# Patient Record
Sex: MALE | Race: OTHER | HISPANIC OR LATINO | URBAN - METROPOLITAN AREA
[De-identification: names, ages, dates, MRNs, and addresses within clinical notes are randomized per-mention and may not be internally consistent; named-entity substitution may affect disease eponyms.]

---

## 2023-08-17 ENCOUNTER — EMERGENCY (EMERGENCY)
Facility: HOSPITAL | Age: 53
LOS: 0 days | Discharge: HOME | End: 2023-08-17
Attending: STUDENT IN AN ORGANIZED HEALTH CARE EDUCATION/TRAINING PROGRAM
Payer: SELF-PAY

## 2023-08-17 VITALS
RESPIRATION RATE: 18 BRPM | WEIGHT: 175.05 LBS | SYSTOLIC BLOOD PRESSURE: 158 MMHG | HEART RATE: 68 BPM | TEMPERATURE: 98 F | DIASTOLIC BLOOD PRESSURE: 89 MMHG | OXYGEN SATURATION: 99 %

## 2023-08-17 DIAGNOSIS — M25.511 PAIN IN RIGHT SHOULDER: ICD-10-CM

## 2023-08-17 DIAGNOSIS — M54.2 CERVICALGIA: ICD-10-CM
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
## 2023-08-17 DIAGNOSIS — R07.89 OTHER CHEST PAIN: ICD-10-CM
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
## 2023-08-17 LAB
ALBUMIN SERPL ELPH-MCNC: 4.7 G/DL — SIGNIFICANT CHANGE UP (ref 3.5–5.2)
ALP SERPL-CCNC: 81 U/L — SIGNIFICANT CHANGE UP (ref 30–115)
ALT FLD-CCNC: 18 U/L — SIGNIFICANT CHANGE UP (ref 0–41)
ANION GAP SERPL CALC-SCNC: 10 MMOL/L — SIGNIFICANT CHANGE UP (ref 7–14)
APPEARANCE UR: CLEAR — SIGNIFICANT CHANGE UP
APTT BLD: 33.4 SEC — SIGNIFICANT CHANGE UP (ref 27–39.2)
AST SERPL-CCNC: 24 U/L — SIGNIFICANT CHANGE UP (ref 0–41)
BASOPHILS # BLD AUTO: 0.08 K/UL — SIGNIFICANT CHANGE UP (ref 0–0.2)
BASOPHILS NFR BLD AUTO: 0.7 % — SIGNIFICANT CHANGE UP (ref 0–1)
BILIRUB SERPL-MCNC: 1 MG/DL — SIGNIFICANT CHANGE UP (ref 0.2–1.2)
BILIRUB UR-MCNC: NEGATIVE — SIGNIFICANT CHANGE UP
BLD GP AB SCN SERPL QL: SIGNIFICANT CHANGE UP
BUN SERPL-MCNC: 14 MG/DL — SIGNIFICANT CHANGE UP (ref 10–20)
CALCIUM SERPL-MCNC: 9 MG/DL — SIGNIFICANT CHANGE UP (ref 8.4–10.5)
CHLORIDE SERPL-SCNC: 103 MMOL/L — SIGNIFICANT CHANGE UP (ref 98–110)
CO2 SERPL-SCNC: 23 MMOL/L — SIGNIFICANT CHANGE UP (ref 17–32)
COLOR SPEC: YELLOW — SIGNIFICANT CHANGE UP
CREAT SERPL-MCNC: 0.7 MG/DL — SIGNIFICANT CHANGE UP (ref 0.7–1.5)
DIFF PNL FLD: NEGATIVE — SIGNIFICANT CHANGE UP
EGFR: 111 ML/MIN/1.73M2 — SIGNIFICANT CHANGE UP
EOSINOPHIL # BLD AUTO: 0.14 K/UL — SIGNIFICANT CHANGE UP (ref 0–0.7)
EOSINOPHIL NFR BLD AUTO: 1.3 % — SIGNIFICANT CHANGE UP (ref 0–8)
ETHANOL SERPL-MCNC: <10 MG/DL — SIGNIFICANT CHANGE UP
GLUCOSE SERPL-MCNC: 89 MG/DL — SIGNIFICANT CHANGE UP (ref 70–99)
GLUCOSE UR QL: NEGATIVE MG/DL — SIGNIFICANT CHANGE UP
HCT VFR BLD CALC: 41.6 % — LOW (ref 42–52)
HGB BLD-MCNC: 13.7 G/DL — LOW (ref 14–18)
IMM GRANULOCYTES NFR BLD AUTO: 0.5 % — HIGH (ref 0.1–0.3)
INR BLD: 1.04 RATIO — SIGNIFICANT CHANGE UP (ref 0.65–1.3)
KETONES UR-MCNC: ABNORMAL MG/DL
LACTATE SERPL-SCNC: 0.7 MMOL/L — SIGNIFICANT CHANGE UP (ref 0.7–2)
LEUKOCYTE ESTERASE UR-ACNC: NEGATIVE — SIGNIFICANT CHANGE UP
LIDOCAIN IGE QN: 22 U/L — SIGNIFICANT CHANGE UP (ref 7–60)
LYMPHOCYTES # BLD AUTO: 2.2 K/UL — SIGNIFICANT CHANGE UP (ref 1.2–3.4)
LYMPHOCYTES # BLD AUTO: 20.1 % — LOW (ref 20.5–51.1)
MCHC RBC-ENTMCNC: 28.1 PG — SIGNIFICANT CHANGE UP (ref 27–31)
MCHC RBC-ENTMCNC: 32.9 G/DL — SIGNIFICANT CHANGE UP (ref 32–37)
MCV RBC AUTO: 85.2 FL — SIGNIFICANT CHANGE UP (ref 80–94)
MONOCYTES # BLD AUTO: 0.59 K/UL — SIGNIFICANT CHANGE UP (ref 0.1–0.6)
MONOCYTES NFR BLD AUTO: 5.4 % — SIGNIFICANT CHANGE UP (ref 1.7–9.3)
NEUTROPHILS # BLD AUTO: 7.91 K/UL — HIGH (ref 1.4–6.5)
NEUTROPHILS NFR BLD AUTO: 72 % — SIGNIFICANT CHANGE UP (ref 42.2–75.2)
NITRITE UR-MCNC: NEGATIVE — SIGNIFICANT CHANGE UP
NRBC # BLD: 0 /100 WBCS — SIGNIFICANT CHANGE UP (ref 0–0)
PH UR: 7 — SIGNIFICANT CHANGE UP (ref 5–8)
PLATELET # BLD AUTO: 218 K/UL — SIGNIFICANT CHANGE UP (ref 130–400)
PMV BLD: 12.2 FL — HIGH (ref 7.4–10.4)
POTASSIUM SERPL-MCNC: 3.4 MMOL/L — LOW (ref 3.5–5)
POTASSIUM SERPL-SCNC: 3.4 MMOL/L — LOW (ref 3.5–5)
PROT SERPL-MCNC: 6.6 G/DL — SIGNIFICANT CHANGE UP (ref 6–8)
PROT UR-MCNC: NEGATIVE MG/DL — SIGNIFICANT CHANGE UP
PROTHROM AB SERPL-ACNC: 11.9 SEC — SIGNIFICANT CHANGE UP (ref 9.95–12.87)
RBC # BLD: 4.88 M/UL — SIGNIFICANT CHANGE UP (ref 4.7–6.1)
RBC # FLD: 13.8 % — SIGNIFICANT CHANGE UP (ref 11.5–14.5)
SODIUM SERPL-SCNC: 136 MMOL/L — SIGNIFICANT CHANGE UP (ref 135–146)
SP GR SPEC: >1.03 — HIGH (ref 1–1.03)
UROBILINOGEN FLD QL: 1 MG/DL — SIGNIFICANT CHANGE UP (ref 0.2–1)
WBC # BLD: 10.97 K/UL — HIGH (ref 4.8–10.8)
WBC # FLD AUTO: 10.97 K/UL — HIGH (ref 4.8–10.8)

## 2023-08-17 PROCEDURE — 86900 BLOOD TYPING SEROLOGIC ABO: CPT

## 2023-08-17 PROCEDURE — 85025 COMPLETE CBC W/AUTO DIFF WBC: CPT

## 2023-08-17 PROCEDURE — 76705 ECHO EXAM OF ABDOMEN: CPT | Mod: 26

## 2023-08-17 PROCEDURE — 72125 CT NECK SPINE W/O DYE: CPT | Mod: 26,MA

## 2023-08-17 PROCEDURE — 85610 PROTHROMBIN TIME: CPT

## 2023-08-17 PROCEDURE — 80053 COMPREHEN METABOLIC PANEL: CPT

## 2023-08-17 PROCEDURE — 71260 CT THORAX DX C+: CPT | Mod: MA

## 2023-08-17 PROCEDURE — 99285 EMERGENCY DEPT VISIT HI MDM: CPT

## 2023-08-17 PROCEDURE — 99284 EMERGENCY DEPT VISIT MOD MDM: CPT | Mod: 25

## 2023-08-17 PROCEDURE — 72170 X-RAY EXAM OF PELVIS: CPT | Mod: 26

## 2023-08-17 PROCEDURE — 71045 X-RAY EXAM CHEST 1 VIEW: CPT | Mod: 26

## 2023-08-17 PROCEDURE — 96374 THER/PROPH/DIAG INJ IV PUSH: CPT | Mod: XU

## 2023-08-17 PROCEDURE — 81003 URINALYSIS AUTO W/O SCOPE: CPT

## 2023-08-17 PROCEDURE — 71045 X-RAY EXAM CHEST 1 VIEW: CPT

## 2023-08-17 PROCEDURE — 74177 CT ABD & PELVIS W/CONTRAST: CPT | Mod: 26,MA

## 2023-08-17 PROCEDURE — 72170 X-RAY EXAM OF PELVIS: CPT

## 2023-08-17 PROCEDURE — 83605 ASSAY OF LACTIC ACID: CPT

## 2023-08-17 PROCEDURE — 73030 X-RAY EXAM OF SHOULDER: CPT | Mod: RT

## 2023-08-17 PROCEDURE — 71260 CT THORAX DX C+: CPT | Mod: 26,MA

## 2023-08-17 PROCEDURE — 74177 CT ABD & PELVIS W/CONTRAST: CPT | Mod: MA

## 2023-08-17 PROCEDURE — 86850 RBC ANTIBODY SCREEN: CPT

## 2023-08-17 PROCEDURE — 70450 CT HEAD/BRAIN W/O DYE: CPT | Mod: MA

## 2023-08-17 PROCEDURE — 80307 DRUG TEST PRSMV CHEM ANLYZR: CPT

## 2023-08-17 PROCEDURE — 83690 ASSAY OF LIPASE: CPT

## 2023-08-17 PROCEDURE — 70450 CT HEAD/BRAIN W/O DYE: CPT | Mod: 26,MA

## 2023-08-17 PROCEDURE — 76705 ECHO EXAM OF ABDOMEN: CPT

## 2023-08-17 PROCEDURE — 72125 CT NECK SPINE W/O DYE: CPT | Mod: MA

## 2023-08-17 PROCEDURE — 85730 THROMBOPLASTIN TIME PARTIAL: CPT

## 2023-08-17 PROCEDURE — 86901 BLOOD TYPING SEROLOGIC RH(D): CPT

## 2023-08-17 PROCEDURE — 36415 COLL VENOUS BLD VENIPUNCTURE: CPT

## 2023-08-17 PROCEDURE — 73030 X-RAY EXAM OF SHOULDER: CPT | Mod: 26,RT

## 2023-08-17 RX ORDER — SODIUM CHLORIDE 9 MG/ML
250 INJECTION INTRAMUSCULAR; INTRAVENOUS; SUBCUTANEOUS ONCE
Refills: 0 | Status: COMPLETED | OUTPATIENT
Start: 2023-08-17 | End: 2023-08-17

## 2023-08-17 RX ORDER — MORPHINE SULFATE 50 MG/1
4 CAPSULE, EXTENDED RELEASE ORAL ONCE
Refills: 0 | Status: DISCONTINUED | OUTPATIENT
Start: 2023-08-17 | End: 2023-08-17

## 2023-08-17 RX ADMIN — MORPHINE SULFATE 4 MILLIGRAM(S): 50 CAPSULE, EXTENDED RELEASE ORAL at 14:30

## 2023-08-17 RX ADMIN — SODIUM CHLORIDE 250 MILLILITER(S): 9 INJECTION INTRAMUSCULAR; INTRAVENOUS; SUBCUTANEOUS at 14:29

## 2023-08-17 NOTE — ED PROVIDER NOTE - ATTENDING APP SHARED VISIT CONTRIBUTION OF CARE
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  NECK: Supple; no meningeal signs  CARD: RRR; nl S1/S2; no M/R/G. Pulses equal bilaterally.  RESP: Respiratory rate and effort are normal; breath sounds clear and equal bilaterally.  ABD: Soft, ND + rlq ttp  MSK/EXT: No gross deformities + ttp right shoulder + midline c5/c6 ttp. + mildine thoracic ttp no lumbar ttp  SKIN: Warm and dry;   NEURO: AAOx3, Motor 5/5 x 4 extremities  PSYCH: Memory Intact, Normal Affect

## 2023-08-17 NOTE — ED PROVIDER NOTE - PHYSICAL EXAMINATION
CONST: Well appearing in NAD  Head: NCAT. No tamez signs or periorbital ecchymosis   EYES: PERRL, EOMI, Sclera and conjunctiva clear.   ENT: Oropharynx normal appearing, no erythema or exudates. Uvula midline.  NECK: midline cervical tenderness. C collar in place.   CARD: Normal S1 S2; Normal rate and rhythm  RESP: Equal BS B/L, No wheezes, rhonchi or rales. No distress  GI: RUQ and LUQ tenderenss. + Guarding. Abd soft, non distended. No Bath or rashid turners sign.   MS: Pain w/ AROM of RUE at shoulder. Midline thoracic tenderness. No palpable stepoffs.   SKIN: Warm, dry, no acute rashes. Good turgor  NEURO: A&Ox3, No focal deficits. Strength 5/5 with no sensory deficits.

## 2023-08-17 NOTE — ED PROVIDER NOTE - PATIENT PORTAL LINK FT
You can access the FollowMyHealth Patient Portal offered by [facility] by registering at the following website: [email or web address]. By joining [facility]’s FollowMyHealth portal, you will also be able to view your health information using other applications (apps) compatible with our system.

## 2023-08-17 NOTE — ED ADULT NURSE NOTE - OBJECTIVE STATEMENT
54 y/o male presents to ED s/p fall from 6 foot ladder when ladder slipped. Patient denies LOC, HT, and AC. Reports landing on right side of body, pain to right arm and ribs at this time

## 2023-08-17 NOTE — ED ADULT NURSE NOTE - NSFALLRISKINTERV_ED_ALL_ED

## 2023-08-17 NOTE — ED PROVIDER NOTE - CLINICAL SUMMARY MEDICAL DECISION MAKING FREE TEXT BOX
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 labs imaging obtained and reviewed. Ct demonstrated < from: CT Head No Cont (08.17.23 @ 13:51) >CT HEAD:No acute intracranial pathology or hemorrhage. No acute calvarial fracture.CT CERVICAL SPINE:No acute fracture or subluxation. ct < from: CT Abdomen and Pelvis w/ IV Cont (08.17.23 @ 14:04) >1. No definite evidence of acute traumatic injury within the chest, abdomen or pelvis., patient felt better reassessed and ambulated. Patient a spoken to in detail about results  All questions addressed.  Results of ED work up discussed and patient given a copy of the results.  Return precautions given. Recommended primary care establishment and follow up.

## 2023-08-17 NOTE — ED PROVIDER NOTE - OBJECTIVE STATEMENT
52 yo male presents for eval s/p fall. 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 Patient w. Pain to R shoulder, ribs and abdomen, worse w. movement. No HS or LOC or AC use.

## 2023-08-17 NOTE — ED ADULT TRIAGE NOTE - CHIEF COMPLAINT QUOTE
Patient presents to ED s/p fall from 6 foot ladder when ladder slipped. Patient denies LOC, HT, and AC. Reports landing on right side of body, pain to right arm and ribs at this time. No known allergies.

## 2023-08-17 NOTE — ED PROVIDER NOTE - NSFOLLOWUPINSTRUCTIONS_ED_ALL_ED_FT
Contusión costal  Rib Contusion  La contusión costal es un moretón profundo en la hilary de las costillas. Las contusiones son el resultado de un traumatismo cerrado que causa hemorragia y lesión en los tejidos subcutáneos. La contusión costal puede incluir hematomas en las costillas, en la piel y los músculos de la hilary. La piel sobre la contusión puede tornarse de color jerel, homar o amarillo. Las lesiones leves provocan blessing contusión indolora. Las contusiones más graves pueden causar dolor y estar hinchadas diaz algunas semanas.    ¿Cuáles son las causas?  Generalmente, esta afección se debe a un golpe directo y con fuerza en blessing hilary del cuerpo. [place] suele ocurrir mientras se practican deportes de contacto.    ¿Cuáles son los signos o síntomas?  Los síntomas de esta afección incluyen:  Hinchazón y enrojecimiento en la hilary de la lesión.  Cambio de color de la hilary lesionada.  Dolor con la palpación y sensibilidad en la hilary de la lesión.  Dolor al moverse o al estar quieto.  Dolor al inspirar.  ¿Cómo se diagnostica?  Esta afección se puede diagnosticar en función de lo siguiente:  Los síntomas y los antecedentes médicos.  Un examen físico.  Estudios de diagnóstico por imágenes, omar blessing radiografía, exploración por tomografía computarizada (TC) o resonancia magnética (RM), para determinar si hubo lesiones internas o huesos fracturados (fracturas).  ¿Cómo se trata?  El tratamiento de esta afección puede incluir:  [place]. A menudo, nikki es el mejor tratamiento para blessing contusión costal.  Compresas de hielo. [place] reducirá la hinchazón y la inflamación.  Practicar ejercicios de respiración profunda. [place] se puede recomendar para reducir el riesgo de colapso pulmonar parcial y neumonía.  Medicamentos. Es posible que también deba abdulaziz medicamentos recetados y de venta bacilio para controlar el dolor.  Inyección de un anestésico alrededor del nervio cerca de la lesión (bloqueo nervioso).  Siga estas instrucciones en wray casa:  Medicamentos    Use los medicamentos de venta bacilio y los recetados solamente omar se lo haya indicado el médico.  Pregúntele al médico si el medicamento recetado:  Hace necesario que evite conducir o usar maquinaria.  Puede causarle estreñimiento. Es posible que tenga que abdulaziz estas medidas para prevenir o tratar el estreñimiento:  Beber suficiente líquido omar para mantener la orina de color amarillo pálido.  Usar medicamentos recetados o de venta bacilio.  Consumir alimentos ricos en fibra, omar frijoles, cereales integrales, y frutas y verduras frescas.  Limitar el consumo de alimentos ricos en grasa y azúcares procesados, omar los alimentos fritos o dulces.  Control del dolor, la rigidez y la hinchazón      Si se lo indican, aplique hielo sobre la hilary de la lesión. Para hacer esto:  Ponga el hielo en blessing bolsa plástica.  Coloque blessing toalla entre la piel y la bolsa.  Aplique el hielo diaz 20 minutos, 2 o 3 veces por día.  Retire el hielo si la piel se pone de color carmona brillante. [place] es muy importante. Si no puede sentir dolor, calor o frío, tiene un mayor riesgo de dañar la hilary.  Actividad     Mantenga la hilary de la lesión en reposo.  No realice actividades extenuantes ni actividades o movimientos que le causen dolor. Sea cuidadoso al realizar actividades y evite golpearse la hilary lesionada.  No levante ningún objeto que pese más de 5 libras (2.3 kg) o que supere el límite de peso que le hayan indicado, hasta que el médico le diga que puede hacerlo.  Indicaciones generales      No consuma ningún producto que contenga nicotina o tabaco, omar cigarrillos, cigarrillos electrónicos y tabaco de mascar. Estos pueden retrasar la recuperación. Si necesita ayuda para dejar de consumir estos productos, consulte al médico.  Teressa ejercicios de respiración profunda omar se lo haya indicado el médico.  Si le entregan un espirómetro incentivo, úselo cada 1 o 2 horas mientras está despierto o según lo recomendado por wray médico. Nikki dispositivo mide qué robert anna marie llena los pulmones con cada respiración.  Cumpla con todas las visitas de seguimiento. [place] es importante.  Comuníquese con un médico si tiene:  Aumento del hematoma o la hinchazón.  Dolor no se nikki con el tratamiento.  Fie[place].  Busca ayuda de inmediato si:  Tiene dificultad para respirar o le falta el aire.  Tiene blessing tos continua o expectora mucosidad espesa o con scottie de los pulmones al toser (esputo).  Siente náuseas o vomita.  Tiene dolor en el abdomen.  Estos síntomas pueden representar un problema grave que constituye blessing emergencia. No espere a amanda si los síntomas desaparecen. Solicite atención médica de inmediato. Comuníquese con el servicio de emergencias de wray localidad (911 en los [place]). No conduzca por ham propios medios hasta el hospital.    Resumen  La contusión costal es un moretón profundo en la hilary de las costillas. Las contusiones son el resultado de un traumatismo cerrado que causa hemorragia y lesión en los tejidos subcutáneos.  La piel sobre la contusión puede tornarse de color jerel, homar o amarillo. Las lesiones menores pueden causar blessing contusión indolora. Las contusiones más graves pueden causar dolor y estar hinchadas diaz algunas semanas.  Mantenga la hilary de la lesión en reposo. No realice actividades extenuantes ni actividades o movimientos que le causen dolor.  Esta información no tiene omar fin reemplazar el consejo del médico. Asegúrese de hacerle al médico cualquier pregunta que tenga.    Dolor en el hombro  Shoulder Pain  Muchas cosas pueden provocar dolor en el hombro, por ejemplo:  Blessing lesión.  Un movimiento del hombro que se repite blessing y otra vez de la misma manera (uso excesivo).  Dolor en las articulaciones (artritis).  El dolor puede deberse a lo siguiente:  Hinchazón e irritación (inflamación) de alguna parte del hombro.  Blessing lesión en la articulación del hombro.  Blessing lesión en:  Los tejidos que conectan el músculo al hueso (tendones).  Los tejidos que conectan los huesos entre sí (ligamentos).  Los huesos.  Siga estas indicaciones en wray casa:  Controle los cambios en ham síntomas. Informe a wray médico acerca de los cambios. Estas indicaciones pueden ayudarlo con el dolor.    Si tiene un cabestrillo:    Use el cabestrillo moar se lo haya indicado el médico. Quíteselo solamente omar se lo haya indicado el médico.  Afloje el cabestrillo si los dedos:  Hormiguean.  Se adormecen.  Se tornan fríos y de color jerel.  Mantenga el cabestrillo limpio.  Si el cabestrillo no es impermeable:  No deje que se moje.  Quítese el cabestrillo para ducharse o bañarse.  Control del dolor, la rigidez y la hinchazón    Bag of ice on a towel on the skin.  Si se lo indican, aplique hielo sobre la hilary dolorida:  Ponga el hielo en blessing bolsa plástica.  Coloque blessing toalla entre la piel y la bolsa.  Coloque el hielo diaz 20 minutos, 2 a 3 veces por día. Deje de aplicarse hielo si no ayuda a aliviar el dolor.  Apriete blessing pelota blanda o blessing almohadilla de goma tanto omar sea posible. [place] impide que el hombro se hinche. También ayuda a fortalecer el brazo.  Indicaciones generales    Use los medicamentos de venta bacilio y los recetados solamente omar se lo haya indicado el médico.  Concurra a todas las visitas de seguimiento omar se lo haya indicado el médico. [place] es importante.  Comuníquese con un médico si:  El dolor empeora.  Los medicamentos no le alivian el dolor.  Siente un dolor nuevo en el brazo, la mano o los dedos.  Solicite ayuda inmediatamente si:  El brazo, la mano o los dedos:  Hormiguean.  Están adormecidos.  Están hinchados.  Están doloridos.  Se tornan de color mary o jerel.  Resumen  Varias pueden ser las causas del dolor en el hombro. Estas incluyen lesiones,  el hombro en el mismo sentido blessing y otra vez, y dolor en las articulaciones.  Controle los cambios en ham síntomas. Informe a wray médico acerca de los cambios.  Esta afección se puede tratar con un cabestrillo, hielo y un medicamento para el dolor.  Comuníquese con wray médico si el dolor empeora o tiene un dolor nuevo. Solicite ayuda de inmediato si el brazo, la mano o los dedos se le adormecen o si siente hormigueo, se le hinchan o le duelen.  Concurra a todas las visitas de seguimiento omar se lo haya indicado el médico. [place] es importante.  Esta información no tiene omar fin reemplazar el consejo del médico. Asegúrese de hacerle al médico cualquier pregunta que tenga.

## 2023-08-17 NOTE — ED PROVIDER NOTE - PROGRESS NOTE DETAILS
JS: All results discussed with patient using . Patient able to ambulate after IV pain meds. Agreeable for d/c with PO anti inflammatories and med clinic f/u.

## 2023-08-17 NOTE — ED PROVIDER NOTE - NS ED ATTENDING STATEMENT MOD
This was a shared visit with the DAVION. I reviewed and verified the documentation and independently performed the documented:

## 2023-08-21 ENCOUNTER — APPOINTMENT (OUTPATIENT)
Dept: INTERNAL MEDICINE | Facility: CLINIC | Age: 53
End: 2023-08-21

## 2023-08-21 ENCOUNTER — OUTPATIENT (OUTPATIENT)
Dept: OUTPATIENT SERVICES | Facility: HOSPITAL | Age: 53
LOS: 1 days | End: 2023-08-21
Payer: COMMERCIAL

## 2023-08-21 VITALS
TEMPERATURE: 97.8 F | WEIGHT: 153 LBS | SYSTOLIC BLOOD PRESSURE: 139 MMHG | OXYGEN SATURATION: 100 % | DIASTOLIC BLOOD PRESSURE: 79 MMHG | HEART RATE: 72 BPM
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
## 2023-08-21 DIAGNOSIS — Z00.00 ENCOUNTER FOR GENERAL ADULT MEDICAL EXAMINATION WITHOUT ABNORMAL FINDINGS: ICD-10-CM

## 2023-08-21 DIAGNOSIS — T14.90XA INJURY, UNSPECIFIED, INITIAL ENCOUNTER: ICD-10-CM

## 2023-08-21 PROCEDURE — 99204 OFFICE O/P NEW MOD 45 MIN: CPT

## 2023-08-21 RX ORDER — IBUPROFEN 200 MG/1
200 TABLET, FILM COATED ORAL EVERY 6 HOURS
Qty: 168 | Refills: 0 | Status: ACTIVE | COMMUNITY
Start: 2023-08-21 | End: 1900-01-01

## 2023-08-21 NOTE — PHYSICAL EXAM
[No Acute Distress] : no acute distress [Well Nourished] : well nourished [No Respiratory Distress] : no respiratory distress  [No Accessory Muscle Use] : no accessory muscle use [Clear to Auscultation] : lungs were clear to auscultation bilaterally [Regular Rhythm] : with a regular rhythm [Normal S1, S2] : normal S1 and S2 [Normal Sclera/Conjunctiva] : normal sclera/conjunctiva [Normal Outer Ear/Nose] : the outer ears and nose were normal in appearance [No JVD] : no jugular venous distention [Normal Rate] : normal rate  [No Carotid Bruits] : no carotid bruits [Normal] : affect was normal and insight and judgment were intact [de-identified] : Right shoulder: tenderness at suprascapularis and subscapularis muscle, no rash. Limited active ROM. Passive ROM limited to below 90degrees at shoulder joint. forearm examination is unremarkable

## 2023-08-21 NOTE — ASSESSMENT
[FreeTextEntry1] : 54 yo with PMHx presenting to the clinic after recent visit to Ed for right shoulder trauma.  # Right shoulder trauma  # Suspected Rotator cuff tear - s/p CT scans in ED with no ffx  - Unremarkable blood exams  - Limited active and passive ROM Plan:   > Motrin 600mg Q6 hrs PRN pain   > Obtain MRI of right shoulder to r/o Rotator cuff injury   > Referral to orthopedic surgery    Follow up with test results or PRN

## 2023-08-21 NOTE — HISTORY OF PRESENT ILLNESS
[FreeTextEntry8] : 54 yo no PMHx presented to the clinic after a recent visit to the ED. Patient had a mechanical fall 4 days ago from a ladder, almost 4 meters. Had right shoulder trauma for which he was rushed to the ED  >> pan scan was neg for any fractures and US abdomen was neg. Patient coming here because of inability to move right shoulder and severe pain. He mentions that the pain has been constant and any minimal arm movement induces it. Has been unable to perform most functions recently.

## 2023-08-22 DIAGNOSIS — M25.511 PAIN IN RIGHT SHOULDER: ICD-10-CM

## 2023-08-22 DIAGNOSIS — T14.90XA INJURY, UNSPECIFIED, INITIAL ENCOUNTER: ICD-10-CM

## 2023-08-22 DIAGNOSIS — Z00.00 ENCOUNTER FOR GENERAL ADULT MEDICAL EXAMINATION WITHOUT ABNORMAL FINDINGS: ICD-10-CM

## 2023-08-24 ENCOUNTER — TRANSCRIPTION ENCOUNTER (OUTPATIENT)
Age: 53
End: 2023-08-24

## 2023-08-24 ENCOUNTER — RESULT REVIEW (OUTPATIENT)
Age: 53
End: 2023-08-24

## 2023-08-24 ENCOUNTER — OUTPATIENT (OUTPATIENT)
Dept: OUTPATIENT SERVICES | Facility: HOSPITAL | Age: 53
LOS: 1 days | End: 2023-08-24
Payer: COMMERCIAL

## 2023-08-24 DIAGNOSIS — M25.511 PAIN IN RIGHT SHOULDER: ICD-10-CM

## 2023-08-24 DIAGNOSIS — Z00.8 ENCOUNTER FOR OTHER GENERAL EXAMINATION: ICD-10-CM

## 2023-08-24 DIAGNOSIS — T14.90XA INJURY, UNSPECIFIED, INITIAL ENCOUNTER: ICD-10-CM

## 2023-08-24 PROCEDURE — 73221 MRI JOINT UPR EXTREM W/O DYE: CPT | Mod: RT

## 2023-08-24 PROCEDURE — 73221 MRI JOINT UPR EXTREM W/O DYE: CPT | Mod: 26,RT

## 2023-08-25 DIAGNOSIS — T14.90XA INJURY, UNSPECIFIED, INITIAL ENCOUNTER: ICD-10-CM

## 2023-08-25 DIAGNOSIS — M25.511 PAIN IN RIGHT SHOULDER: ICD-10-CM

## 2023-10-25 ENCOUNTER — APPOINTMENT (OUTPATIENT)
Dept: ORTHOPEDIC SURGERY | Facility: CLINIC | Age: 53
End: 2023-10-25